# Patient Record
Sex: MALE | Race: WHITE | HISPANIC OR LATINO | Employment: UNEMPLOYED | URBAN - METROPOLITAN AREA
[De-identification: names, ages, dates, MRNs, and addresses within clinical notes are randomized per-mention and may not be internally consistent; named-entity substitution may affect disease eponyms.]

---

## 2020-10-03 ENCOUNTER — HOSPITAL ENCOUNTER (EMERGENCY)
Facility: MEDICAL CENTER | Age: 20
End: 2020-10-03
Attending: EMERGENCY MEDICINE
Payer: MEDICAID

## 2020-10-03 VITALS
HEIGHT: 69 IN | TEMPERATURE: 98 F | SYSTOLIC BLOOD PRESSURE: 126 MMHG | HEART RATE: 71 BPM | WEIGHT: 179.9 LBS | BODY MASS INDEX: 26.64 KG/M2 | DIASTOLIC BLOOD PRESSURE: 58 MMHG | OXYGEN SATURATION: 98 % | RESPIRATION RATE: 16 BRPM

## 2020-10-03 DIAGNOSIS — K08.89 PAIN, DENTAL: ICD-10-CM

## 2020-10-03 PROCEDURE — 99281 EMR DPT VST MAYX REQ PHY/QHP: CPT

## 2020-10-03 RX ORDER — AMOXICILLIN 500 MG/1
500 TABLET, FILM COATED ORAL 3 TIMES DAILY
Qty: 30 TAB | Refills: 0 | Status: SHIPPED | OUTPATIENT
Start: 2020-10-03 | End: 2020-10-13

## 2020-10-03 RX ORDER — TRAMADOL HYDROCHLORIDE 50 MG/1
50-100 TABLET ORAL EVERY 6 HOURS PRN
Qty: 16 TAB | Refills: 0 | Status: SHIPPED | OUTPATIENT
Start: 2020-10-03 | End: 2020-10-08

## 2020-10-03 SDOH — HEALTH STABILITY: MENTAL HEALTH: HOW OFTEN DO YOU HAVE A DRINK CONTAINING ALCOHOL?: NEVER

## 2020-10-03 NOTE — ED TRIAGE NOTES
Ambulates to triage  Chief Complaint   Patient presents with   • Dental Pain     L lower pain, no swelling     VSS, denies any fever, took tylenol prior to coming here. Has paperwork stating he needs a root canal.

## 2020-10-03 NOTE — ED PROVIDER NOTES
"ED Provider Note      CHIEF COMPLAINT  Chief Complaint   Patient presents with   • Dental Pain     L lower pain, no swelling       HPI  Deangelo Obando is a 20 y.o. male who presents with dental pain.  Patient states the pain has been present for at least a month, became much more severe in the last week.  States he could not sleep last night, crying in pain this morning.  Currently calm, relaxed, states \"I am looking for antibiotics\"..   No difficulty breathing, no difficulty swallowing.  Patient has a piece of paper from a dental evaluation stating he needs to find an endodontist, pain is emanating from his left lower canine.      REVIEW OF SYSTEMS    Constitutional: No fever.  Respiratory: No difficulty breathing   Ear nose throat: Dental pain         PAST MEDICAL HISTORY  History reviewed. No pertinent past medical history.    FAMILY HISTORY  History reviewed. No pertinent family history.    SOCIAL HISTORY  Social History     Socioeconomic History   • Marital status: Single     Spouse name: Not on file   • Number of children: Not on file   • Years of education: Not on file   • Highest education level: Not on file   Occupational History   • Not on file   Social Needs   • Financial resource strain: Not on file   • Food insecurity     Worry: Not on file     Inability: Not on file   • Transportation needs     Medical: Not on file     Non-medical: Not on file   Tobacco Use   • Smoking status: Never Smoker   • Smokeless tobacco: Never Used   Substance and Sexual Activity   • Alcohol use: Never     Frequency: Never   • Drug use: Never   • Sexual activity: Not on file   Lifestyle   • Physical activity     Days per week: Not on file     Minutes per session: Not on file   • Stress: Not on file   Relationships   • Social connections     Talks on phone: Not on file     Gets together: Not on file     Attends Baptism service: Not on file     Active member of club or organization: Not on file     Attends meetings of " "clubs or organizations: Not on file     Relationship status: Not on file   • Intimate partner violence     Fear of current or ex partner: Not on file     Emotionally abused: Not on file     Physically abused: Not on file     Forced sexual activity: Not on file   Other Topics Concern   • Not on file   Social History Narrative   • Not on file       SURGICAL HISTORY  Past Surgical History:   Procedure Laterality Date   • TONSILLECTOMY         CURRENT MEDICATIONS  Home Medications     Reviewed by Alida Hairston R.N. (Registered Nurse) on 10/03/20 at 0800  Med List Status: Complete   Medication Last Dose Status        Patient Jimi Taking any Medications                       ALLERGIES  No Known Allergies    PHYSICAL EXAM  VITAL SIGNS: /63   Pulse 71   Temp 36 °C (96.8 °F) (Temporal)   Resp 16   Ht 1.753 m (5' 9\")   Wt 81.6 kg (179 lb 14.3 oz)   SpO2 96%   BMI 26.57 kg/m²    Constitutional:  Non-toxic appearance.   HENT: Small defect left lower canine neck, slight gingival swelling, no evidence of abscess.  Posterior pharynx normal  Eyes: Anicteric, no conjunctivitis.     Neurologic: Speech is clear, follows commands, facial expression is symmetrical.  Psychiatric: Affect normal,  Mood normal.   Musculoskeletal: Neck appears supple with good range of motion      COURSE & MEDICAL DECISION MAKING  Pertinent Labs & Imaging studies reviewed. (See chart for details)  Patient started on amoxicillin, increase in pain may indicate dental infection.  After discussion with the patient regarding pain control, he is prescribed tramadol.  He states Tylenol and Motrin have been ineffective to help with his discomfort.  Patient refused dose of pain medication in the emergency department    FINAL IMPRESSION  1. Pain, dental  tramadol (ULTRAM) 50 MG Tab        In prescribing controlled substances to this patient, I certify that I have obtained and reviewed the medical history of Deangelo Obando. I have also made a " good arin effort to obtain applicable records from other providers who have treated the patient and records did not demonstrate any increased risk of substance abuse that would prevent me from prescribing controlled substances.     I have conducted a physical exam and documented it. I have reviewed Mr. Obando’s prescription history as maintained by the [place] Prescription Monitoring Program.     I have assessed the patient’s risk for abuse, dependency, and addiction using the validated Opioid Risk Tool available at https://www.mdcalc.com/ogpmgg-bsiu-khaj-ort-narcotic-abuse.     Given the above, I believe the benefits of controlled substance therapy outweigh the risks. The reasons for prescribing controlled substances include non-narcotic, oral analgesic alternatives have been inadequate for pain control. Accordingly, I have discussed the risk and benefits, treatment plan, and alternative therapies with the patient.         Electronically signed by: Roland Hernández M.D., 10/3/2020 8:14 AM

## 2020-10-03 NOTE — DISCHARGE INSTRUCTIONS
See a dentist (endodontist) as soon as possible.  Return for fever, difficulty swallowing, difficulty breathing or any concerns

## 2020-10-03 NOTE — ED NOTES
Pt resting on gurney, pt in no acute distress, pt provided call light, instructed to call if needing any assistance, instructed not to get up by self, gurney in lowest position.    No obvious swelling, speech clear

## 2020-10-03 NOTE — ED NOTES
Pt discharged, reviewed all discharge instructions including medications and follow up,discussed no driving or drinking alcohol on narcotics and the risks of taking narcotics, pt given prescription, pt verbalizes understanding, and denies questions.  Escorted to lobby.